# Patient Record
Sex: MALE | Race: WHITE
[De-identification: names, ages, dates, MRNs, and addresses within clinical notes are randomized per-mention and may not be internally consistent; named-entity substitution may affect disease eponyms.]

---

## 2021-04-15 DIAGNOSIS — Z23 ENCOUNTER FOR IMMUNIZATION: ICD-10-CM

## 2024-09-10 LAB
ALBUMIN SERPL-MCNC: 5 G/DL (ref 3.5–5)
ALP SERPL-CCNC: 63 U/L (ref 38–126)
ALT SERPL-CCNC: 40 U/L (ref 0–50)
AST SERPL-CCNC: 45 U/L (ref 17–59)
BUN SERPL-MCNC: 32 MG/DL (ref 9–20)
CALCIUM SERPL-MCNC: 10.4 MG/DL (ref 8.4–10.2)
CHLORIDE SERPL-SCNC: 101 MMOL/L (ref 98–107)
CO2 SERPL-SCNC: 26 MMOL/L (ref 22–30)
EGFR: > 60
ERYTHROCYTE [DISTWIDTH] IN BLOOD BY AUTOMATED COUNT: 12.7 % (ref 11.5–14.5)
ESTIMATED CREATININE CLEARANCE: 67 ML/MIN
GLUCOSE SERPL-MCNC: 109 MG/DL (ref 70–99)
HCT VFR BLD AUTO: 43.7 % (ref 39–52)
HGB BLD-MCNC: 15.6 G/DL (ref 13–18)
MCHC RBC AUTO-ENTMCNC: 35.7 G/DL (ref 33–37)
MCV RBC AUTO: 97.3 FL (ref 80–94)
PLATELET # BLD AUTO: 145 10^3/UL (ref 130–400)
POTASSIUM SERPL-SCNC: 4.1 MMOL/L (ref 3.5–5.1)
PROT SERPL-MCNC: 7.6 G/DL (ref 6.3–8.2)
SODIUM SERPL-SCNC: 145 MMOL/L (ref 135–145)

## 2024-09-11 LAB — HBA1C MFR BLD: 6 % (ref 4–5.6)

## 2024-09-16 NOTE — VNURNOTE
"Patient is scheduled for an elective L TKA on 9/30/24- he is a same day patient with Dr Marrero. Spoke with patient prior to surgery. Introduced role of [facility] liaison.  Patient reports that he lives with his wife in a MULTI story home. There is a "~"threshold to enter. There are 5 ARTURO first floor with powder room.  There are another 5 stairs to enter second floor with bedroom. "~"Patient agreeable to use powder room and sleep on sofa if unable to get to second floor."~"He currently functions independently. He has a cane and rolling walker."~"PCP is Christy Pierre."~"Discussed orthopedic program and post surgical plans. "~"Reviewed that he will have VN services initially and will then start outpatient PT. "~"Patient selects [facility] for his home care needs and will go to [facility] for outpatient PT.  He needs a rx for outpt PT. "~"Patient is in agreement with plan and states that his wife will be home with him."~"Referral placed in [facility]. "~"Plan: [facility] then outpt PT "

## 2024-09-30 ENCOUNTER — HOSPITAL ENCOUNTER (OUTPATIENT)
Dept: HOSPITAL 99 - SDS | Age: 79
Discharge: HOME | End: 2024-09-30
Payer: COMMERCIAL

## 2024-09-30 VITALS — DIASTOLIC BLOOD PRESSURE: 69 MMHG | SYSTOLIC BLOOD PRESSURE: 143 MMHG | RESPIRATION RATE: 16 BRPM

## 2024-09-30 VITALS — RESPIRATION RATE: 21 BRPM | SYSTOLIC BLOOD PRESSURE: 107 MMHG | DIASTOLIC BLOOD PRESSURE: 58 MMHG

## 2024-09-30 VITALS — RESPIRATION RATE: 18 BRPM | SYSTOLIC BLOOD PRESSURE: 117 MMHG | DIASTOLIC BLOOD PRESSURE: 59 MMHG

## 2024-09-30 VITALS — RESPIRATION RATE: 18 BRPM | DIASTOLIC BLOOD PRESSURE: 67 MMHG

## 2024-09-30 VITALS — SYSTOLIC BLOOD PRESSURE: 114 MMHG | DIASTOLIC BLOOD PRESSURE: 60 MMHG

## 2024-09-30 VITALS — SYSTOLIC BLOOD PRESSURE: 117 MMHG | DIASTOLIC BLOOD PRESSURE: 64 MMHG

## 2024-09-30 VITALS — DIASTOLIC BLOOD PRESSURE: 59 MMHG

## 2024-09-30 VITALS — OXYGEN SATURATION: 2 % | DIASTOLIC BLOOD PRESSURE: 56 MMHG

## 2024-09-30 VITALS — RESPIRATION RATE: 16 BRPM | SYSTOLIC BLOOD PRESSURE: 116 MMHG | DIASTOLIC BLOOD PRESSURE: 61 MMHG

## 2024-09-30 VITALS — RESPIRATION RATE: 18 BRPM | DIASTOLIC BLOOD PRESSURE: 65 MMHG | SYSTOLIC BLOOD PRESSURE: 133 MMHG

## 2024-09-30 VITALS — HEART RATE: 79 BPM | DIASTOLIC BLOOD PRESSURE: 64 MMHG | OXYGEN SATURATION: 97 % | SYSTOLIC BLOOD PRESSURE: 102 MMHG

## 2024-09-30 VITALS — OXYGEN SATURATION: 2 % | DIASTOLIC BLOOD PRESSURE: 62 MMHG

## 2024-09-30 VITALS — SYSTOLIC BLOOD PRESSURE: 108 MMHG | RESPIRATION RATE: 22 BRPM | DIASTOLIC BLOOD PRESSURE: 59 MMHG

## 2024-09-30 VITALS — BODY MASS INDEX: 27.5 KG/M2

## 2024-09-30 VITALS — DIASTOLIC BLOOD PRESSURE: 58 MMHG

## 2024-09-30 VITALS — OXYGEN SATURATION: 2 %

## 2024-09-30 DIAGNOSIS — M17.12: Primary | ICD-10-CM

## 2024-09-30 DIAGNOSIS — E11.9: ICD-10-CM

## 2024-09-30 DIAGNOSIS — I25.10: ICD-10-CM

## 2024-09-30 DIAGNOSIS — E78.5: ICD-10-CM

## 2024-09-30 DIAGNOSIS — I10: ICD-10-CM

## 2024-09-30 LAB
GLUCOSE - POINT OF CARE: 116 MG/DL (ref 70–99)
GLUCOSE - POINT OF CARE: 146 MG/DL (ref 70–99)

## 2024-09-30 PROCEDURE — 0SRD0J9 REPLACEMENT OF LEFT KNEE JOINT WITH SYNTHETIC SUBSTITUTE, CEMENTED, OPEN APPROACH: ICD-10-PCS | Performed by: SPECIALIST

## 2024-09-30 PROCEDURE — C1713 ANCHOR/SCREW BN/BN,TIS/BN: HCPCS

## 2024-09-30 PROCEDURE — 27447 TOTAL KNEE ARTHROPLASTY: CPT

## 2024-09-30 PROCEDURE — C1776 JOINT DEVICE (IMPLANTABLE): HCPCS

## 2024-09-30 RX ADMIN — MELOXICAM 15 MG: 15 TABLET ORAL at 06:36

## 2024-09-30 RX ADMIN — ACETAMINOPHEN 1000 MG: 500 TABLET ORAL at 12:53

## 2024-09-30 RX ADMIN — CEFAZOLIN 5: 10 INJECTION, POWDER, FOR SOLUTION INTRAVENOUS at 11:41

## 2024-09-30 RX ADMIN — SODIUM CHLORIDE, SODIUM ACETATE ANHYDROUS, SODIUM GLUCONATE, POTASSIUM CHLORIDE, AND MAGNESIUM CHLORIDE 1000: 526; 222; 502; 37; 30 INJECTION, SOLUTION INTRAVENOUS at 11:00

## 2024-09-30 RX ADMIN — ACETAMINOPHEN 650 MG: 325 TABLET ORAL at 06:36

## 2024-09-30 RX ADMIN — SODIUM CHLORIDE, SODIUM ACETATE ANHYDROUS, SODIUM GLUCONATE, POTASSIUM CHLORIDE, AND MAGNESIUM CHLORIDE 1000: 526; 222; 502; 37; 30 INJECTION, SOLUTION INTRAVENOUS at 06:37

## 2024-12-25 ENCOUNTER — HOSPITAL ENCOUNTER (EMERGENCY)
Dept: HOSPITAL 99 - EMR | Age: 79
Discharge: HOME | End: 2024-12-25
Payer: COMMERCIAL

## 2024-12-25 VITALS — RESPIRATION RATE: 20 BRPM | SYSTOLIC BLOOD PRESSURE: 134 MMHG | DIASTOLIC BLOOD PRESSURE: 73 MMHG

## 2024-12-25 VITALS — SYSTOLIC BLOOD PRESSURE: 130 MMHG | DIASTOLIC BLOOD PRESSURE: 60 MMHG

## 2024-12-25 VITALS — SYSTOLIC BLOOD PRESSURE: 129 MMHG | DIASTOLIC BLOOD PRESSURE: 60 MMHG

## 2024-12-25 VITALS — DIASTOLIC BLOOD PRESSURE: 60 MMHG | SYSTOLIC BLOOD PRESSURE: 123 MMHG

## 2024-12-25 VITALS — SYSTOLIC BLOOD PRESSURE: 131 MMHG | DIASTOLIC BLOOD PRESSURE: 69 MMHG

## 2024-12-25 VITALS — BODY MASS INDEX: 27 KG/M2

## 2024-12-25 DIAGNOSIS — Z86.73: ICD-10-CM

## 2024-12-25 DIAGNOSIS — Z95.5: ICD-10-CM

## 2024-12-25 DIAGNOSIS — Z87.442: ICD-10-CM

## 2024-12-25 DIAGNOSIS — I10: ICD-10-CM

## 2024-12-25 DIAGNOSIS — Z82.49: ICD-10-CM

## 2024-12-25 DIAGNOSIS — Z90.49: ICD-10-CM

## 2024-12-25 DIAGNOSIS — E11.9: ICD-10-CM

## 2024-12-25 DIAGNOSIS — G47.30: ICD-10-CM

## 2024-12-25 DIAGNOSIS — E78.00: ICD-10-CM

## 2024-12-25 DIAGNOSIS — R06.00: Primary | ICD-10-CM

## 2024-12-25 DIAGNOSIS — I25.10: ICD-10-CM

## 2024-12-25 LAB
ALBUMIN SERPL-MCNC: 4.5 G/DL (ref 3.5–5)
ALP SERPL-CCNC: 51 U/L (ref 38–126)
ALT SERPL-CCNC: 35 U/L (ref 0–50)
AST SERPL-CCNC: 42 U/L (ref 17–59)
BUN SERPL-MCNC: 25 MG/DL (ref 9–20)
CALCIUM SERPL-MCNC: 9.7 MG/DL (ref 8.4–10.2)
CHLORIDE SERPL-SCNC: 102 MMOL/L (ref 98–107)
CO2 SERPL-SCNC: 30 MMOL/L (ref 22–30)
EGFR: > 60
ERYTHROCYTE [DISTWIDTH] IN BLOOD BY AUTOMATED COUNT: 13.2 % (ref 11.5–14.5)
GLUCOSE SERPL-MCNC: 169 MG/DL (ref 70–99)
HCT VFR BLD AUTO: 40.1 % (ref 39–52)
HGB BLD-MCNC: 13.9 G/DL (ref 13–18)
MCHC RBC AUTO-ENTMCNC: 34.7 G/DL (ref 33–37)
MCV RBC AUTO: 93.9 FL (ref 80–94)
NRBC BLD AUTO-RTO: 0 %
PLATELET # BLD AUTO: 144 10^3/UL (ref 130–400)
POTASSIUM SERPL-SCNC: 3.8 MMOL/L (ref 3.5–5.1)
PROT SERPL-MCNC: 7 G/DL (ref 6.3–8.2)
SODIUM SERPL-SCNC: 139 MMOL/L (ref 135–145)
TROPONIN I SERPL-MCNC: < 0.012 NG/ML
TROPONIN I SERPL-MCNC: < 0.012 NG/ML

## 2024-12-25 PROCEDURE — 99284 EMERGENCY DEPT VISIT MOD MDM: CPT

## 2025-01-20 ENCOUNTER — HOSPITAL ENCOUNTER (OUTPATIENT)
Dept: HOSPITAL 99 - PAVMRI | Age: 80
End: 2025-01-20
Payer: COMMERCIAL

## 2025-01-20 DIAGNOSIS — R29.898: Primary | ICD-10-CM

## 2025-01-20 PROCEDURE — A9575 INJ GADOTERATE MEGLUMI 0.1ML: HCPCS

## 2025-03-25 ENCOUNTER — HOSPITAL ENCOUNTER (OUTPATIENT)
Dept: HOSPITAL 99 - EMG | Age: 80
End: 2025-03-25
Payer: COMMERCIAL

## 2025-03-25 DIAGNOSIS — R29.898: Primary | ICD-10-CM

## 2025-04-25 ENCOUNTER — HOSPITAL ENCOUNTER (OUTPATIENT)
Dept: HOSPITAL 99 - PAVMRI | Age: 80
End: 2025-04-25
Payer: COMMERCIAL

## 2025-04-25 DIAGNOSIS — M21.371: ICD-10-CM

## 2025-04-25 DIAGNOSIS — M54.16: Primary | ICD-10-CM

## 2025-04-25 DIAGNOSIS — M47.27: ICD-10-CM

## 2025-05-14 ENCOUNTER — HOSPITAL ENCOUNTER (OUTPATIENT)
Dept: HOSPITAL 99 - RAD | Age: 80
End: 2025-05-14
Payer: COMMERCIAL

## 2025-05-14 DIAGNOSIS — N13.30: Primary | ICD-10-CM
